# Patient Record
Sex: MALE | Race: WHITE | URBAN - METROPOLITAN AREA
[De-identification: names, ages, dates, MRNs, and addresses within clinical notes are randomized per-mention and may not be internally consistent; named-entity substitution may affect disease eponyms.]

---

## 2017-01-20 ENCOUNTER — TRANSFERRED RECORDS (OUTPATIENT)
Dept: HEALTH INFORMATION MANAGEMENT | Facility: CLINIC | Age: 22
End: 2017-01-20

## 2020-04-16 ENCOUNTER — TELEPHONE (OUTPATIENT)
Dept: CONSULT | Facility: CLINIC | Age: 25
End: 2020-04-16

## 2020-04-16 DIAGNOSIS — Z84.89 FAMILY HISTORY OF GENETIC DISEASE: Primary | ICD-10-CM

## 2020-04-16 NOTE — TELEPHONE ENCOUNTER
"Tani contacted me to discuss results for his daughter and to arrange blood draw for follow-up parental studies. We reviewed the following:    - We reviewed the genetic testing that was previously completed for Tani's daughter called a chromosomal microarray (details can be found in her chart). This testing looks for large missing or extra pieces of DNA. 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 Individuals with duplications of SHOX have been reported to be taller or shorter than expected based on parental heights and have also been reported to have neurodevelopmental concerns including autism. However, SHOX duplications have also been well documented in unaffected controls. These findings alone do not explain his daughter's features.   - The laboratory is recommending complimentary parental testing for this duplication to see if it is a new change in his daughter or if it was inherited from a parent (or the result of a chromosomal rearrangement). We reviewed benefits/limitations of this testing, including that this was completely voluntary. After discussion Tani would like to proceed with testing to aid in the interpretation of his daughter's results and to learn more about potential reproductive risks.   - Tani is typically at the hospital on weekends. I have checked with lab staff and they report that for draw [facility] can [facility] and tell security that he needs a lab draw, they will page lab staff and someone will come to get him to draw in peds sedation. Lab staff let me know that he does not need an appointment for this, but that orders should be marked as \"lab collect\" and future. A consent form was provided to the family and this should be brought with them to the lab.   - Tani is already registered in our system but his insurance information needs to be updated. He was encouraged to call our scheduling line at [phone] to update this " information BEFORE blood draw.   - These family studies are complimentary. However, there may be a fee associated with the blood draw itself. If the family receives a bill for the testing portion they are encouraged to contact us.   - I will give the family a call once parental results have returned.     Tani expressed understanding and had no questions at this time. He is encouraged to call as needed.    Call duration: 14 minutes    Griselda Nguyễn  Licensed Genetic Counselor  [phone]

## 2020-04-18 PROCEDURE — 40000891 ZZHCL STATISTIC CGH PARENTAL FOLLOW UP 81228: Performed by: MEDICAL GENETICS

## 2020-04-18 PROCEDURE — 40000803 ZZHCL STATISTIC DNA ISOL HIGH PURITY: Performed by: MEDICAL GENETICS

## 2020-04-18 PROCEDURE — 36415 COLL VENOUS BLD VENIPUNCTURE: CPT | Performed by: MEDICAL GENETICS

## 2020-05-15 LAB — COPATH REPORT: NORMAL

## 2020-05-19 ENCOUNTER — DOCUMENTATION ONLY (OUTPATIENT)
Dept: CONSULT | Facility: CLINIC | Age: 25
End: 2020-05-19

## 2020-09-14 NOTE — PROGRESS NOTES
Late entry:  5/19/2020    I contacted the family to review their daughter's updated test results in consideration of parental results. Details outlined in daughter's chart. The family expressed understanding and had no other questions at this time. They are encouraged to call as needed.     Griselda Nguyễn  Licensed Genetic Counselor  [phone]